# Patient Record
Sex: FEMALE | Race: BLACK OR AFRICAN AMERICAN
[De-identification: names, ages, dates, MRNs, and addresses within clinical notes are randomized per-mention and may not be internally consistent; named-entity substitution may affect disease eponyms.]

---

## 2022-05-21 ENCOUNTER — HOSPITAL ENCOUNTER (EMERGENCY)
Dept: HOSPITAL 61 - ER | Age: 52
Discharge: HOME | End: 2022-05-21
Payer: COMMERCIAL

## 2022-05-21 VITALS — DIASTOLIC BLOOD PRESSURE: 80 MMHG | SYSTOLIC BLOOD PRESSURE: 165 MMHG

## 2022-05-21 VITALS — WEIGHT: 189.6 LBS | HEIGHT: 63 IN | BODY MASS INDEX: 33.59 KG/M2
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
## 2022-05-21 DIAGNOSIS — M54.50: Primary | ICD-10-CM

## 2022-05-21 DIAGNOSIS — G89.11: ICD-10-CM
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
## 2022-05-21 LAB
ALBUMIN SERPL-MCNC: 3.3 G/DL (ref 3.4–5)
ALBUMIN/GLOB SERPL: 0.9 {RATIO} (ref 1–1.7)
ALP SERPL-CCNC: 67 U/L (ref 46–116)
ALT SERPL-CCNC: 23 U/L (ref 14–59)
ANION GAP SERPL CALC-SCNC: 10 MMOL/L (ref 6–14)
AST SERPL-CCNC: 18 U/L (ref 15–37)
BACTERIA #/AREA URNS HPF: (no result) /HPF
BASOPHILS # BLD AUTO: 0.1 X10^3/UL (ref 0–0.2)
BASOPHILS NFR BLD: 1 % (ref 0–3)
BILIRUB SERPL-MCNC: 0.1 MG/DL (ref 0.2–1)
BUN SERPL-MCNC: 22 MG/DL (ref 7–20)
BUN/CREAT SERPL: 22 (ref 6–20)
CALCIUM SERPL-MCNC: 9 MG/DL (ref 8.5–10.1)
CHLORIDE SERPL-SCNC: 112 MMOL/L (ref 98–107)
CO2 SERPL-SCNC: 24 MMOL/L (ref 21–32)
CREAT SERPL-MCNC: 1 MG/DL (ref 0.6–1)
EOSINOPHIL NFR BLD: 0.2 X10^3/UL (ref 0–0.7)
EOSINOPHIL NFR BLD: 2 % (ref 0–3)
ERYTHROCYTE [DISTWIDTH] IN BLOOD BY AUTOMATED COUNT: 16 % (ref 11.5–14.5)
GFR SERPLBLD BASED ON 1.73 SQ M-ARVRAT: 58.9 ML/MIN
GLUCOSE SERPL-MCNC: 95 MG/DL (ref 70–99)
HCT VFR BLD CALC: 35.5 % (ref 36–47)
HGB BLD-MCNC: 11.5 G/DL (ref 12–15.5)
LIPASE: 119 U/L (ref 73–393)
LYMPHOCYTES # BLD: 1.7 X10^3/UL (ref 1–4.8)
LYMPHOCYTES NFR BLD AUTO: 25 % (ref 24–48)
MCH RBC QN AUTO: 27 PG (ref 25–35)
MCHC RBC AUTO-ENTMCNC: 32 G/DL (ref 31–37)
MCV RBC AUTO: 82 FL (ref 79–100)
MONO #: 0.4 X10^3/UL (ref 0–1.1)
MONOCYTES NFR BLD: 6 % (ref 0–9)
NEUT #: 4.3 X10^3/UL (ref 1.8–7.7)
NEUTROPHILS NFR BLD AUTO: 65 % (ref 31–73)
PLATELET # BLD AUTO: 257 X10^3/UL (ref 140–400)
POTASSIUM SERPL-SCNC: 4.3 MMOL/L (ref 3.5–5.1)
PROT SERPL-MCNC: 7 G/DL (ref 6.4–8.2)
RBC # BLD AUTO: 4.32 X10^6/UL (ref 3.5–5.4)
RBC #/AREA URNS HPF: 0 /HPF (ref 0–2)
SODIUM SERPL-SCNC: 146 MMOL/L (ref 136–145)
WBC # BLD AUTO: 6.6 X10^3/UL (ref 4–11)
WBC #/AREA URNS HPF: (no result) /HPF (ref 0–4)

## 2022-05-21 PROCEDURE — 80053 COMPREHEN METABOLIC PANEL: CPT

## 2022-05-21 PROCEDURE — 83690 ASSAY OF LIPASE: CPT

## 2022-05-21 PROCEDURE — 85025 COMPLETE CBC W/AUTO DIFF WBC: CPT

## 2022-05-21 PROCEDURE — 72100 X-RAY EXAM L-S SPINE 2/3 VWS: CPT

## 2022-05-21 PROCEDURE — 99284 EMERGENCY DEPT VISIT MOD MDM: CPT

## 2022-05-21 PROCEDURE — 87086 URINE CULTURE/COLONY COUNT: CPT

## 2022-05-21 PROCEDURE — 36415 COLL VENOUS BLD VENIPUNCTURE: CPT

## 2022-05-21 PROCEDURE — 81001 URINALYSIS AUTO W/SCOPE: CPT

## 2022-05-21 PROCEDURE — 96374 THER/PROPH/DIAG INJ IV PUSH: CPT

## 2022-05-21 RX ADMIN — KETOROLAC TROMETHAMINE ONE MG: 30 INJECTION, SOLUTION INTRAMUSCULAR at 02:43

## 2022-05-21 NOTE — RAD
XR LUMBAR SPINE 2-3V 5/21/2022 2:53 AM



Indication: Lower back pain after fall 3 months ago



COMPARISON: None available



TECHNIQUE: 3 views of the lumbar spine are provided.



Findings: 



Alignment of the lumbar spine is normal. Vertebral body heights are maintained. No acute fracture is 
identified.



Disc heights are maintained. No significant endplate degenerative changes are identified. There is no
 significant facet arthropathy. No significant osseous neuroforaminal stenosis or spinal canal stenos
is.



Nonobstructive bowel gas pattern. Visualized portions of the sacrum appear intact.



Impression:



No acute fracture or malalignment of the lumbar spine.



Electronically signed by: Chrystal Kwon MD (5/21/2022 3:13 AM) [facility]

## 2022-05-21 NOTE — PHYS DOC
Past Medical History


Past Surgical History:  No Surgical History


Smoking Status:  Never Smoker


Alcohol Use:  None





General Adult


EDM:


Chief Complaint:  back pain





HPI:


HPI:





Patient is a 51  year old female who presented to ER for evaluation of low back 

pain off and on for several weeks.  Patient said about 3 months ago she tripped 

she fell on the buttock.  Patient states she landed so hard on her buttocks that

it jammed her  lower back.  Patient did complain of low back pain with any 

movement.  Patient denies any abdominal pain, no bowel or bladder incontinence. 

Patient denies any urinary symptoms.  Patient denies any fever.





Review of Systems:


Review of Systems:


Constitutional:   Denies fever or chills. []


Eyes:   Denies change in visual acuity. []


HENT:   Denies nasal congestion or sore throat. [] 


Respiratory:   Denies cough or shortness of breath. [] 


Cardiovascular:   Denies chest pain or edema. [] 


GI:   Denies abdominal pain, nausea, vomiting, bloody stools or diarrhea. [] 


:  Denies dysuria. [] 


Musculoskeletal:   positive for lower back pain. 


Integument:   Denies rash. [] 


Neurologic:   Denies headache, focal weakness or sensory changes. [] 


Endocrine:   Denies polyuria or polydipsia. [] 


Lymphatic:  Denies swollen glands. [] 


Psychiatric:  Denies depression or anxiety. []





Heart Score:


C/O Chest Pain:  N/A


Risk Factors:


Risk Factors:  DM, Current or recent (<one month) smoker, HTN, HLP, family 

history of CAD, obesity.


Risk Scores:


Score 0 - 3:  2.5% MACE over next 6 weeks - Discharge Home


Score 4 - 6:  20.3% MACE over next 6 weeks - Admit for Clinical Observation


Score 7 - 10:  72.7% MACE over next 6 weeks - Early Invasive Strategies





Current Medications:





Current Medications








 Medications


  (Trade)  Dose


 Ordered  Sig/Tyrel  Start Time


 Stop Time Status Last Admin


Dose Admin


 


 Ketorolac


 Tromethamine


  (Toradol 15mg


 Vial)  15 mg  STK-MED ONCE  22 02:37


 22 02:38 DC  





 


 Ketorolac


 Tromethamine


  (Toradol 30mg


 Vial)  30 mg  1X  ONCE  22 03:00


 22 03:01  22 02:43


30 MG











Allergies:


Allergies:





Allergies








Coded Allergies Type Severity Reaction Last Updated Verified


 


  No Known Drug Allergies    22 No











Physical Exam:


PE:





Constitutional: Well developed, well nourished, no acute distress, non-toxic 

appearance. []


HENT: Normocephalic, atraumatic, bilateral external ears normal, oropharynx 

moist, no oral exudates, nose normal. []


Eyes: PERRLA, EOMI, conjunctiva normal, no discharge. [] 


Neck: Normal range of motion, no tenderness, supple, no stridor. [] 


Cardiovascular:Heart rate regular rhythm, no murmur []


Lungs & Thorax:  Bilateral breath sounds clear to auscultation []


Abdomen: Bowel sounds normal, soft, no tenderness, no masses, no pulsatile 

masses. [] 


Skin: Warm, dry, no erythema, no rash. [] 


Back: There is tenderness to palpation at L2/L3 AREA.   no CVA tenderness. [] 


Extremities: No tenderness, no cyanosis, no clubbing, ROM intact, no edema. [] 


Neurologic: Alert and oriented X 3, normal motor function, normal sensory 

function, no focal deficits noted. []


Psychologic: Affect normal, judgement normal, mood normal. []





Current Patient Data:


Labs:





                                Laboratory Tests








Test


 22


02:19


 


White Blood Count


 6.6 x10^3/uL


(4.0-11.0)


 


Red Blood Count


 4.32 x10^6/uL


(3.50-5.40)


 


Hemoglobin


 11.5 g/dL


(12.0-15.5)  L


 


Hematocrit


 35.5 %


(36.0-47.0)  L


 


Mean Corpuscular Volume


 82 fL ()





 


Mean Corpuscular Hemoglobin 27 pg (25-35)  


 


Mean Corpuscular Hemoglobin


Concent 32 g/dL


(31-37)


 


Red Cell Distribution Width


 16.0 %


(11.5-14.5)  H


 


Platelet Count


 257 x10^3/uL


(140-400)


 


Neutrophils (%) (Auto) 65 % (31-73)  


 


Lymphocytes (%) (Auto) 25 % (24-48)  


 


Monocytes (%) (Auto) 6 % (0-9)  


 


Eosinophils (%) (Auto) 2 % (0-3)  


 


Basophils (%) (Auto) 1 % (0-3)  


 


Neutrophils # (Auto)


 4.3 x10^3/uL


(1.8-7.7)


 


Lymphocytes # (Auto)


 1.7 x10^3/uL


(1.0-4.8)


 


Monocytes # (Auto)


 0.4 x10^3/uL


(0.0-1.1)


 


Eosinophils # (Auto)


 0.2 x10^3/uL


(0.0-0.7)


 


Basophils # (Auto)


 0.1 x10^3/uL


(0.0-0.2)


 


Urine Collection Type Unknown  


 


Urine Color (Auto) Light yellow  


 


Urine Turbidity Clear  


 


Urine pH (Auto)


 6.0 (<5.0-8.0)





 


Urine Specific Gravity


 1.026


(1.000-1.030)


 


Urine Protein (Auto)


 Negative mg/dL


(Negative)


 


Urine Glucose (Auto)(UA)


 Negative mg/dL


(Negative)


 


Urine Ketones (Auto)


 Negative mg/dL


(Negative)


 


Urine Blood (Auto)


 Negative


(Negative)


 


Urine Nitrite


 Negative


(Negative)


 


Urine Bilirubin (Auto)


 Negative


(Negative)


 


Urine Urobilinogen (Auto)


 Normal mg/dL


(Normal)


 


Urine Leukocyte Esterase


(Auto) Negative


(Negative)


 


Urine RBC 0 /HPF (0-2)  


 


Urine WBC


 5-10 /HPF


(0-4)


 


Urine Squamous Epithelial


Cells Mod /LPF  





 


Urine Bacteria


 Few /HPF


(0-FEW)


 


Urine Mucus Slight /LPF  


 


Sodium Level


 146 mmol/L


(136-145)  H


 


Potassium Level


 4.3 mmol/L


(3.5-5.1)


 


Chloride Level


 112 mmol/L


()  H


 


Carbon Dioxide Level


 24 mmol/L


(21-32)


 


Anion Gap 10 (6-14)  


 


Blood Urea Nitrogen


 22 mg/dL


(7-20)  H


 


Creatinine


 1.0 mg/dL


(0.6-1.0)


 


Estimated GFR


(Cockcroft-Gault) 58.9  





 


BUN/Creatinine Ratio 22 (6-20)  H


 


Glucose Level


 95 mg/dL


(70-99)


 


Calcium Level


 9.0 mg/dL


(8.5-10.1)


 


Total Bilirubin Pending  


 


Aspartate Amino Transferase


(AST) Pending  





 


Alanine Aminotransferase (ALT) Pending  


 


Alkaline Phosphatase Pending  


 


Total Protein Pending  


 


Albumin Pending  


 


Albumin/Globulin Ratio Pending  


 


Lipase Pending  





                                Laboratory Tests


22 02:19








                                Laboratory Tests


22 02:19








Vital Signs:





                                   Vital Signs








  Date Time  Temp Pulse Resp B/P (MAP) Pulse Ox O2 Delivery O2 Flow Rate FiO2


 


22 01:47 98.1 65 18 161/78 (105) 99 Room Air  





 98.1       











EKG:


EKG:


[]





Radiology/Procedures:


Radiology/Procedures:


[][facility]


                    [address] [place] [address]


                                 [phone]


                                        


                                 IMAGING REPORT





                                     Signed





PATIENT: ELENITA KELLYCOUNT: [id number]     MRN#: [id number]


: 1970           LOCATION: ER              AGE: 51


SEX: F                    EXAM DT: 22         ACCESSION#: [id number]


STATUS: REG ER            ORD. PHYSICIAN: GELACIO MALDONADO DO


REASON: lower back pain after she fell 3 months ago


PROCEDURE: LUMBAR SPINE 2-3V





XR LUMBAR SPINE 2-3V 2022 2:53 AM





Indication: Lower back pain after fall 3 months ago





COMPARISON: None available





TECHNIQUE: 3 views of the lumbar spine are provided.





Findings: 





Alignment of the lumbar spine is normal. Vertebral body heights are maintained. 

No acute fracture is identified.





Disc heights are maintained. No significant endplate degenerative changes are 

identified. There is no significant facet arthropathy. No significant osseous 

neuroforaminal stenosis or spinal canal stenosis.





Nonobstructive bowel gas pattern. Visualized portions of the sacrum appear 

intact.





Impression:





No acute fracture or malalignment of the lumbar spine.





Electronically signed by: Lilly Morrison MD (2022 3:13 AM) [facility]














DICTATED and SIGNED BY:     LILLY MORRISON MD


DATE:     22





Course & Med Decision Making:


Course & Med Decision Making


Pertinent Labs and Imaging studies reviewed. (See chart for details)





Patient is a 51-year-old female who presented to ER for evaluation of low back 

pain.  X-ray and lab work did not show any acute problem.  Patient will be 

discharged home with some pain medication and some muscle relaxer.  Patient will

 need to follow-up with her family physician for further evaluation and 

treatment.





Dragon Disclaimer:


Dragon Disclaimer:


This electronic medical record was generated, in whole or in part, using a voice

 recognition dictation system.





Departure


Departure


Impression:  


   Primary Impression:  


   Lower back pain


Disposition:   HOME / SELF CARE / HOMELESS


Condition:  IMPROVED


Referrals:  


UNKNOWN PCP NAME (PCP)


Follow up with your doctor next week for reevaluation.


Patient Instructions:  Back Pain, Adult





Additional Instructions:  


Thank you for visiting our Emergency Department. We appreciate you trusting us 

with your care. If any additional problems come up don't hesitate to return to 

visit us. Please follow up with your primary care provider so they can plan 

additional care if needed and know about the problem that you had. If symptoms 

worsen come back to the Emergency Department. Any concerning symptoms that start

 such as chest pain, shortness of air, weakness or numbness on one side of the 

body, running high fevers or any other concerning symptoms return to the ER.


Scripts


Cyclobenzaprine Hcl (CYCLOBENZAPRINE HCL) 10 Mg Tablet


1 TAB PO TID PRN for MUSCLE SPASMS, #20 TAB


   Prov: GELACIO MALDONADO DO         22 


Ibuprofen (IBUPROFEN) 800 Mg Tablet


800 MG PO PRN Q6HRS PRN for PAIN, #30 TAB


   Prov: GELACIO MALDONADO DO         22 


Prednisone (PREDNISONE) 20 Mg Tablet


1 TAB PO DAILY for 7 Days, #7 TAB


   Prov: GELACIO MALDONADO DO         22











GELACIO MALDONADO DO                May 21, 2022 02:54